# Patient Record
Sex: FEMALE | URBAN - METROPOLITAN AREA
[De-identification: names, ages, dates, MRNs, and addresses within clinical notes are randomized per-mention and may not be internally consistent; named-entity substitution may affect disease eponyms.]

---

## 2023-08-09 ENCOUNTER — TELEPHONE (OUTPATIENT)
Dept: OBSTETRICS AND GYNECOLOGY | Facility: CLINIC | Age: 68
End: 2023-08-09

## 2023-08-09 NOTE — TELEPHONE ENCOUNTER
----- Message from Estefani Andrade sent at 8/7/2023  8:10 AM CDT -----  Regarding: Jessy Worrell  Contact: [phone]/Lu/Tor Worrell is requesting orders for a follow up Diagnostic Unilateral Mammo of Right Breast. Abnormal mammo screen. She is scheduled on 08/08 at 8:45 am. Thanks   Fax: [phone]

## 2023-08-09 NOTE — TELEPHONE ENCOUNTER
[facility].     [facility]  [facility] up Diagnostic Unilateral Mammo of Right Breast. Abnormal mammo screen. She is scheduled on 08/08 at 8:45 am. Thanks   Fax: [phone]